# Patient Record
Sex: FEMALE | Race: WHITE | NOT HISPANIC OR LATINO | Employment: FULL TIME | URBAN - METROPOLITAN AREA
[De-identification: names, ages, dates, MRNs, and addresses within clinical notes are randomized per-mention and may not be internally consistent; named-entity substitution may affect disease eponyms.]

---

## 2017-01-05 ENCOUNTER — GENERIC CONVERSION - ENCOUNTER (OUTPATIENT)
Dept: OTHER | Facility: OTHER | Age: 62
End: 2017-01-05

## 2017-01-11 ENCOUNTER — GENERIC CONVERSION - ENCOUNTER (OUTPATIENT)
Dept: OTHER | Facility: OTHER | Age: 62
End: 2017-01-11

## 2017-03-15 ENCOUNTER — ALLSCRIPTS OFFICE VISIT (OUTPATIENT)
Dept: OTHER | Facility: OTHER | Age: 62
End: 2017-03-15

## 2017-03-16 ENCOUNTER — ALLSCRIPTS OFFICE VISIT (OUTPATIENT)
Dept: OTHER | Facility: OTHER | Age: 62
End: 2017-03-16

## 2017-05-01 ENCOUNTER — TRANSCRIBE ORDERS (OUTPATIENT)
Dept: ADMINISTRATIVE | Facility: HOSPITAL | Age: 62
End: 2017-05-01

## 2017-05-01 ENCOUNTER — GENERIC CONVERSION - ENCOUNTER (OUTPATIENT)
Dept: OTHER | Facility: OTHER | Age: 62
End: 2017-05-01

## 2017-05-01 ENCOUNTER — ALLSCRIPTS OFFICE VISIT (OUTPATIENT)
Dept: OTHER | Facility: OTHER | Age: 62
End: 2017-05-01

## 2017-05-01 DIAGNOSIS — Z12.31 VISIT FOR SCREENING MAMMOGRAM: Primary | ICD-10-CM

## 2017-05-04 LAB
ADEQUACY: (HISTORICAL): NORMAL
CLINICIAN PROVIDIED ICD 9 OR 10 (HISTORICAL): NORMAL
COMMENT (HISTORICAL): NORMAL
DIAGNOSIS (HISTORICAL): NORMAL
NOTE: (HISTORICAL): NORMAL
PERFORMED BY (HISTORICAL): NORMAL
TEST INFORMATION (HISTORICAL): NORMAL

## 2017-05-11 ENCOUNTER — GENERIC CONVERSION - ENCOUNTER (OUTPATIENT)
Dept: OTHER | Facility: OTHER | Age: 62
End: 2017-05-11

## 2017-06-01 DIAGNOSIS — Z12.31 ENCOUNTER FOR SCREENING MAMMOGRAM FOR MALIGNANT NEOPLASM OF BREAST: ICD-10-CM

## 2017-06-06 ENCOUNTER — ALLSCRIPTS OFFICE VISIT (OUTPATIENT)
Dept: OTHER | Facility: OTHER | Age: 62
End: 2017-06-06

## 2017-06-07 ENCOUNTER — GENERIC CONVERSION - ENCOUNTER (OUTPATIENT)
Dept: OTHER | Facility: OTHER | Age: 62
End: 2017-06-07

## 2017-06-19 ENCOUNTER — GENERIC CONVERSION - ENCOUNTER (OUTPATIENT)
Dept: OTHER | Facility: OTHER | Age: 62
End: 2017-06-19

## 2017-07-06 ENCOUNTER — TRANSCRIBE ORDERS (OUTPATIENT)
Dept: ADMINISTRATIVE | Facility: HOSPITAL | Age: 62
End: 2017-07-06

## 2017-07-06 DIAGNOSIS — Z09 FOLLOW UP: Primary | ICD-10-CM

## 2017-07-06 DIAGNOSIS — Z12.31 VISIT FOR SCREENING MAMMOGRAM: Primary | ICD-10-CM

## 2017-07-13 ENCOUNTER — ALLSCRIPTS OFFICE VISIT (OUTPATIENT)
Dept: OTHER | Facility: OTHER | Age: 62
End: 2017-07-13

## 2017-07-18 ENCOUNTER — HOSPITAL ENCOUNTER (OUTPATIENT)
Dept: ULTRASOUND IMAGING | Facility: CLINIC | Age: 62
Discharge: HOME | End: 2017-07-18
Payer: COMMERCIAL

## 2017-07-18 ENCOUNTER — HOSPITAL ENCOUNTER (OUTPATIENT)
Dept: MAMMOGRAPHY | Facility: CLINIC | Age: 62
Discharge: HOME | End: 2017-07-18
Payer: COMMERCIAL

## 2017-07-18 DIAGNOSIS — Z12.31 ENCOUNTER FOR SCREENING MAMMOGRAM FOR MALIGNANT NEOPLASM OF BREAST: ICD-10-CM

## 2017-07-18 DIAGNOSIS — Z09 FOLLOW UP: ICD-10-CM

## 2017-07-18 PROCEDURE — 77063 BREAST TOMOSYNTHESIS BI: CPT

## 2017-07-18 PROCEDURE — G0202 SCR MAMMO BI INCL CAD: HCPCS

## 2017-07-18 PROCEDURE — 76642 ULTRASOUND BREAST LIMITED: CPT

## 2017-07-28 ENCOUNTER — TRANSCRIBE ORDERS (OUTPATIENT)
Dept: ADMINISTRATIVE | Age: 62
End: 2017-07-28

## 2017-07-28 ENCOUNTER — APPOINTMENT (OUTPATIENT)
Dept: LAB | Age: 62
End: 2017-07-28
Payer: COMMERCIAL

## 2017-07-28 DIAGNOSIS — M06.9 RHEUMATOID ARTHRITIS, INVOLVING UNSPECIFIED SITE, UNSPECIFIED RHEUMATOID FACTOR PRESENCE: Primary | ICD-10-CM

## 2017-07-28 DIAGNOSIS — M17.0 PRIMARY OSTEOARTHRITIS OF BOTH KNEES: ICD-10-CM

## 2017-07-28 DIAGNOSIS — M47.816 OSTEOARTHRITIS OF LUMBAR SPINE, UNSPECIFIED SPINAL OSTEOARTHRITIS COMPLICATION STATUS: ICD-10-CM

## 2017-07-28 DIAGNOSIS — M15.0 PRIMARY GENERALIZED HYPERTROPHIC OSTEOARTHROSIS: ICD-10-CM

## 2017-07-28 DIAGNOSIS — Q61.3 POLYCYSTIC KIDNEY, UNSPECIFIED TYPE: ICD-10-CM

## 2017-07-28 DIAGNOSIS — I10 ESSENTIAL HYPERTENSION, MALIGNANT: ICD-10-CM

## 2017-07-28 DIAGNOSIS — M06.9 RHEUMATOID ARTHRITIS, INVOLVING UNSPECIFIED SITE, UNSPECIFIED RHEUMATOID FACTOR PRESENCE: ICD-10-CM

## 2017-07-28 LAB
ALBUMIN SERPL BCP-MCNC: 3.9 G/DL (ref 3.5–5)
ALP SERPL-CCNC: 97 U/L (ref 46–116)
ALT SERPL W P-5'-P-CCNC: 27 U/L (ref 12–78)
ANION GAP SERPL CALCULATED.3IONS-SCNC: 6 MMOL/L (ref 4–13)
AST SERPL W P-5'-P-CCNC: 23 U/L (ref 5–45)
BASOPHILS # BLD AUTO: 0.04 THOUSANDS/ΜL (ref 0–0.1)
BASOPHILS NFR BLD AUTO: 1 % (ref 0–1)
BILIRUB SERPL-MCNC: 0.53 MG/DL (ref 0.2–1)
BILIRUB UR QL STRIP: NEGATIVE
BUN SERPL-MCNC: 14 MG/DL (ref 5–25)
CALCIUM SERPL-MCNC: 9.2 MG/DL (ref 8.3–10.1)
CHLORIDE SERPL-SCNC: 101 MMOL/L (ref 100–108)
CHOLEST SERPL-MCNC: 173 MG/DL (ref 50–200)
CLARITY UR: CLEAR
CO2 SERPL-SCNC: 30 MMOL/L (ref 21–32)
COLOR UR: YELLOW
CREAT SERPL-MCNC: 0.86 MG/DL (ref 0.6–1.3)
EOSINOPHIL # BLD AUTO: 0.11 THOUSAND/ΜL (ref 0–0.61)
EOSINOPHIL NFR BLD AUTO: 2 % (ref 0–6)
ERYTHROCYTE [DISTWIDTH] IN BLOOD BY AUTOMATED COUNT: 14.3 % (ref 11.6–15.1)
ERYTHROCYTE [SEDIMENTATION RATE] IN BLOOD: 7 MM/HOUR (ref 0–20)
GFR SERPL CREATININE-BSD FRML MDRD: 72 ML/MIN/1.73SQ M
GLUCOSE P FAST SERPL-MCNC: 107 MG/DL (ref 65–99)
GLUCOSE UR STRIP-MCNC: NEGATIVE MG/DL
HCT VFR BLD AUTO: 39.1 % (ref 34.8–46.1)
HDLC SERPL-MCNC: 51 MG/DL (ref 40–60)
HGB BLD-MCNC: 12.5 G/DL (ref 11.5–15.4)
HGB UR QL STRIP.AUTO: NEGATIVE
KETONES UR STRIP-MCNC: NEGATIVE MG/DL
LDLC SERPL CALC-MCNC: 98 MG/DL (ref 0–100)
LEUKOCYTE ESTERASE UR QL STRIP: NEGATIVE
LYMPHOCYTES # BLD AUTO: 1.46 THOUSANDS/ΜL (ref 0.6–4.47)
LYMPHOCYTES NFR BLD AUTO: 31 % (ref 14–44)
MCH RBC QN AUTO: 30.3 PG (ref 26.8–34.3)
MCHC RBC AUTO-ENTMCNC: 32 G/DL (ref 31.4–37.4)
MCV RBC AUTO: 95 FL (ref 82–98)
MONOCYTES # BLD AUTO: 0.34 THOUSAND/ΜL (ref 0.17–1.22)
MONOCYTES NFR BLD AUTO: 7 % (ref 4–12)
NEUTROPHILS # BLD AUTO: 2.7 THOUSANDS/ΜL (ref 1.85–7.62)
NEUTS SEG NFR BLD AUTO: 59 % (ref 43–75)
NITRITE UR QL STRIP: NEGATIVE
NRBC BLD AUTO-RTO: 0 /100 WBCS
PH UR STRIP.AUTO: 6.5 [PH] (ref 4.5–8)
PLATELET # BLD AUTO: 285 THOUSANDS/UL (ref 149–390)
PMV BLD AUTO: 11.6 FL (ref 8.9–12.7)
POTASSIUM SERPL-SCNC: 3.8 MMOL/L (ref 3.5–5.3)
PROT SERPL-MCNC: 7.3 G/DL (ref 6.4–8.2)
PROT UR STRIP-MCNC: NEGATIVE MG/DL
RBC # BLD AUTO: 4.12 MILLION/UL (ref 3.81–5.12)
SODIUM SERPL-SCNC: 137 MMOL/L (ref 136–145)
SP GR UR STRIP.AUTO: 1.02 (ref 1–1.03)
TRIGL SERPL-MCNC: 120 MG/DL
TSH SERPL DL<=0.05 MIU/L-ACNC: 2.87 UIU/ML (ref 0.36–3.74)
UROBILINOGEN UR QL STRIP.AUTO: 0.2 E.U./DL
WBC # BLD AUTO: 4.66 THOUSAND/UL (ref 4.31–10.16)

## 2017-07-28 PROCEDURE — 80053 COMPREHEN METABOLIC PANEL: CPT

## 2017-07-28 PROCEDURE — 85652 RBC SED RATE AUTOMATED: CPT

## 2017-07-28 PROCEDURE — 36415 COLL VENOUS BLD VENIPUNCTURE: CPT

## 2017-07-28 PROCEDURE — 85025 COMPLETE CBC W/AUTO DIFF WBC: CPT

## 2017-07-28 PROCEDURE — 80061 LIPID PANEL: CPT

## 2017-07-28 PROCEDURE — 84443 ASSAY THYROID STIM HORMONE: CPT

## 2017-07-28 PROCEDURE — 81003 URINALYSIS AUTO W/O SCOPE: CPT | Performed by: INTERNAL MEDICINE

## 2017-07-30 ENCOUNTER — GENERIC CONVERSION - ENCOUNTER (OUTPATIENT)
Dept: OTHER | Facility: OTHER | Age: 62
End: 2017-07-30

## 2017-08-25 DIAGNOSIS — H04.123 DRY EYE SYNDROME OF BOTH LACRIMAL GLANDS: ICD-10-CM

## 2017-08-25 DIAGNOSIS — I10 ESSENTIAL (PRIMARY) HYPERTENSION: ICD-10-CM

## 2017-08-25 DIAGNOSIS — Q61.3 POLYCYSTIC KIDNEY: ICD-10-CM

## 2017-08-29 ENCOUNTER — ALLSCRIPTS OFFICE VISIT (OUTPATIENT)
Dept: OTHER | Facility: OTHER | Age: 62
End: 2017-08-29

## 2017-08-29 LAB
BILIRUB UR QL STRIP: NEGATIVE
CLARITY UR: NORMAL
COLOR UR: YELLOW
GLUCOSE (HISTORICAL): NEGATIVE
HGB UR QL STRIP.AUTO: NEGATIVE
KETONES UR STRIP-MCNC: NEGATIVE MG/DL
LEUKOCYTE ESTERASE UR QL STRIP: NEGATIVE
NITRITE UR QL STRIP: NEGATIVE
PH UR STRIP.AUTO: 6.5 [PH]
PROT UR STRIP-MCNC: NEGATIVE MG/DL
SP GR UR STRIP.AUTO: 1.01
UROBILINOGEN UR QL STRIP.AUTO: 0.2

## 2017-09-07 ENCOUNTER — HOSPITAL ENCOUNTER (OUTPATIENT)
Dept: RADIOLOGY | Age: 62
Discharge: HOME | End: 2017-09-07
Payer: COMMERCIAL

## 2017-09-07 DIAGNOSIS — Q61.3 POLYCYSTIC KIDNEY: ICD-10-CM

## 2017-09-07 PROCEDURE — 76770 US EXAM ABDO BACK WALL COMP: CPT

## 2017-09-14 ENCOUNTER — APPOINTMENT (OUTPATIENT)
Dept: LAB | Age: 62
End: 2017-09-14
Payer: COMMERCIAL

## 2017-09-14 ENCOUNTER — TRANSCRIBE ORDERS (OUTPATIENT)
Dept: ADMINISTRATIVE | Age: 62
End: 2017-09-14

## 2017-09-14 DIAGNOSIS — H04.123 DRY EYE SYNDROME OF BOTH LACRIMAL GLANDS: ICD-10-CM

## 2017-09-14 DIAGNOSIS — Q61.3 POLYCYSTIC KIDNEY: ICD-10-CM

## 2017-09-14 DIAGNOSIS — I10 ESSENTIAL (PRIMARY) HYPERTENSION: ICD-10-CM

## 2017-09-14 LAB
CREAT UR-MCNC: 95.2 MG/DL
PROT UR-MCNC: 17 MG/DL
PROT/CREAT UR: 0.18 MG/G{CREAT} (ref 0–0.1)

## 2017-09-14 PROCEDURE — 82570 ASSAY OF URINE CREATININE: CPT

## 2017-09-14 PROCEDURE — 86235 NUCLEAR ANTIGEN ANTIBODY: CPT

## 2017-09-14 PROCEDURE — 36415 COLL VENOUS BLD VENIPUNCTURE: CPT

## 2017-09-14 PROCEDURE — 84156 ASSAY OF PROTEIN URINE: CPT

## 2017-09-15 LAB
ENA SS-A AB SER-ACNC: <0.2 AI (ref 0–0.9)
ENA SS-B AB SER-ACNC: <0.2 AI (ref 0–0.9)

## 2017-09-27 ENCOUNTER — GENERIC CONVERSION - ENCOUNTER (OUTPATIENT)
Dept: OTHER | Facility: OTHER | Age: 62
End: 2017-09-27

## 2017-10-24 ENCOUNTER — TRANSCRIBE ORDERS (OUTPATIENT)
Dept: ADMINISTRATIVE | Age: 62
End: 2017-10-24

## 2017-10-24 ENCOUNTER — LAB (OUTPATIENT)
Dept: LAB | Age: 62
End: 2017-10-24
Payer: COMMERCIAL

## 2017-10-24 DIAGNOSIS — M06.09 RHEUMATOID ARTHRITIS OF MULTIPLE SITES WITHOUT RHEUMATOID FACTOR (HCC): ICD-10-CM

## 2017-10-24 DIAGNOSIS — M06.09 RHEUMATOID ARTHRITIS OF MULTIPLE SITES WITHOUT RHEUMATOID FACTOR (HCC): Primary | ICD-10-CM

## 2017-10-24 DIAGNOSIS — Z79.899 NEED FOR PROPHYLACTIC CHEMOTHERAPY: ICD-10-CM

## 2017-10-24 LAB
BASOPHILS # BLD AUTO: 0.03 THOUSANDS/ΜL (ref 0–0.1)
BASOPHILS NFR BLD AUTO: 1 % (ref 0–1)
BILIRUB UR QL STRIP: NEGATIVE
CLARITY UR: CLEAR
COLOR UR: YELLOW
EOSINOPHIL # BLD AUTO: 0.13 THOUSAND/ΜL (ref 0–0.61)
EOSINOPHIL NFR BLD AUTO: 2 % (ref 0–6)
ERYTHROCYTE [DISTWIDTH] IN BLOOD BY AUTOMATED COUNT: 13.8 % (ref 11.6–15.1)
ERYTHROCYTE [SEDIMENTATION RATE] IN BLOOD: 7 MM/HOUR (ref 0–20)
GLUCOSE UR STRIP-MCNC: NEGATIVE MG/DL
HCT VFR BLD AUTO: 35.5 % (ref 34.8–46.1)
HGB BLD-MCNC: 11.8 G/DL (ref 11.5–15.4)
HGB UR QL STRIP.AUTO: NEGATIVE
KETONES UR STRIP-MCNC: NEGATIVE MG/DL
LEUKOCYTE ESTERASE UR QL STRIP: NEGATIVE
LYMPHOCYTES # BLD AUTO: 1.99 THOUSANDS/ΜL (ref 0.6–4.47)
LYMPHOCYTES NFR BLD AUTO: 34 % (ref 14–44)
MCH RBC QN AUTO: 31.6 PG (ref 26.8–34.3)
MCHC RBC AUTO-ENTMCNC: 33.2 G/DL (ref 31.4–37.4)
MCV RBC AUTO: 95 FL (ref 82–98)
MONOCYTES # BLD AUTO: 0.41 THOUSAND/ΜL (ref 0.17–1.22)
MONOCYTES NFR BLD AUTO: 7 % (ref 4–12)
NEUTROPHILS # BLD AUTO: 3.36 THOUSANDS/ΜL (ref 1.85–7.62)
NEUTS SEG NFR BLD AUTO: 56 % (ref 43–75)
NITRITE UR QL STRIP: NEGATIVE
NRBC BLD AUTO-RTO: 0 /100 WBCS
PH UR STRIP.AUTO: 7.5 [PH] (ref 4.5–8)
PLATELET # BLD AUTO: 248 THOUSANDS/UL (ref 149–390)
PMV BLD AUTO: 11.5 FL (ref 8.9–12.7)
PROT UR STRIP-MCNC: NEGATIVE MG/DL
RBC # BLD AUTO: 3.74 MILLION/UL (ref 3.81–5.12)
SP GR UR STRIP.AUTO: 1.01 (ref 1–1.03)
UROBILINOGEN UR QL STRIP.AUTO: 0.2 E.U./DL
WBC # BLD AUTO: 5.93 THOUSAND/UL (ref 4.31–10.16)

## 2017-10-24 PROCEDURE — 85025 COMPLETE CBC W/AUTO DIFF WBC: CPT

## 2017-10-24 PROCEDURE — 85652 RBC SED RATE AUTOMATED: CPT

## 2017-10-24 PROCEDURE — 80053 COMPREHEN METABOLIC PANEL: CPT

## 2017-10-24 PROCEDURE — 81003 URINALYSIS AUTO W/O SCOPE: CPT | Performed by: INTERNAL MEDICINE

## 2017-10-24 PROCEDURE — 36415 COLL VENOUS BLD VENIPUNCTURE: CPT

## 2017-10-25 LAB
ALBUMIN SERPL BCP-MCNC: 3.9 G/DL (ref 3.5–5)
ALP SERPL-CCNC: 89 U/L (ref 46–116)
ALT SERPL W P-5'-P-CCNC: 35 U/L (ref 12–78)
ANION GAP SERPL CALCULATED.3IONS-SCNC: 9 MMOL/L (ref 4–13)
AST SERPL W P-5'-P-CCNC: 30 U/L (ref 5–45)
BILIRUB SERPL-MCNC: 0.5 MG/DL (ref 0.2–1)
BUN SERPL-MCNC: 19 MG/DL (ref 5–25)
CALCIUM SERPL-MCNC: 8.7 MG/DL (ref 8.3–10.1)
CHLORIDE SERPL-SCNC: 98 MMOL/L (ref 100–108)
CO2 SERPL-SCNC: 29 MMOL/L (ref 21–32)
CREAT SERPL-MCNC: 0.93 MG/DL (ref 0.6–1.3)
GFR SERPL CREATININE-BSD FRML MDRD: 66 ML/MIN/1.73SQ M
GLUCOSE SERPL-MCNC: 91 MG/DL (ref 65–140)
POTASSIUM SERPL-SCNC: 4.4 MMOL/L (ref 3.5–5.3)
PROT SERPL-MCNC: 7.2 G/DL (ref 6.4–8.2)
SODIUM SERPL-SCNC: 136 MMOL/L (ref 136–145)

## 2018-01-10 NOTE — RESULT NOTES
Verified Results  *US BREAST RIGHT LIMITED (DIAGNOSTIC) [id number] 04:27PM Sandee Edwards Order Number: [id number]    - Patient Instructions: To schedule this appointment, please contact Central Scheduling at [phone]  Test Name Result Flag Reference   US BREAST RIGHT LIMITED (Report)     Patient History:   Patient is postmenopausal    Family history of unknown cancer in father  Reason for exam: clinical finding  Mammo Diagnostic Left W CAD: December 13, 2016 - Check In #:    [de-identified]   CC, MLO, ML, spot compression MLO, and spot compression CC    view(s) were taken of the left breast      Technologist: SKIP Briceno (SKIP)(M)   Prior study comparison: June 6, 2016, mammo screening bilateral W   CAD performed at [address] [place] [address]  June 6, 2016, US breast right limited performed at [address] [place] [address]  December 3, 2015, left breast unilateral complete   WBUS UPUS performed at [address] [place] [address]  December 3, 2015, right breast unilateral complete WBUS UPUS    performed at [address] [place] [address]  December 3,    2015, right breast unilateral limited RBC UP performed at [address] [place] [address]  May 29, 2015, right breast    ultrasound, performed at [facility]  May 21, 2015, digital    bilateral screening mammogram, performed at [facility]     April 8, 2014, digital bilateral screening mammogram, performed    at [facility]  March 5, 2013, digital bilateral screening    mammogram, performed at [facility]      The breast tissue is heterogeneously dense, potentially limiting    the sensitivity of mammography  Patient risk, included in this    report, assists in determining the appropriate screening regimen    (such as 3-D mammography or the inclusion of automated breast    ultrasound or MRI)  3-D mammography may also remain indicated as    screening       US Breast Right Limited: December 13, 2016 - Check In #: [id number]   Technologist: Marko Kiran RDMS   Prior to imaging, a radiopaque marker was applied to the skin of    the left breast in the area of pain, as indicated by the patient  The parenchymal pattern is stable  There are no dominant masses    or suspicious microcalcifications  There is also no evidence of    architectural distortion or skin thickening  The left axillary    region is benign  Following diagnostic mammography, the patient was taken to the    ultrasound suite  BILATERAL BREAST ULTRASOUND:     Grayscale sonography of both breasts was performed in multiple    projections using real time imaging  RIGHT:   6 nodules are seen in the right breast      At the 12 o'clock position, in a periareolar location, there is a   well-defined hypoechoic nodule with posterior enhancement,    measuring 0 6 x 0 4 x 0 4 cm  At the 11 o'clock position, 1 cm from the nipple, there is a    hypoechoic 1 x 1 1 x 0 7 cm nodule  At the 12 o'clock position, 1 cm from the nipple, there is a 0 7    x 0 5 x 0 2 cm markedly hypoechoic nodule  At the 10 o'clock position, 3 to 4 cm from the nipple, there is a   hypoechoic nodule with posterior enhancement, measuring 0 6 x    0 4 x 0 4 cm  At the 2 o'clock position, 6 cm from the nipple, there is a    hypoechoic nodule which measures 0 5 x 0 5 x 0 2 cm  At the 2 o'clock position, 4 cm from the nipple, there is a    hypoechoic 0 6 x 0 5 x 0 1 cm nodule  None of these nodules demonstrate internal vascularity  There    are also not significantly changed when compared to studies    dating back to May 2015  LEFT BREAST:   Grayscale sonography of the inferior left breast in the area of    pain reveals no focal cystic or solid nodules  1  Stable left breast mammogram    2  Unremarkable left breast ultrasound in the area of pain  3  Stable nodules in the right breast as seen on ultrasound     4  The patient should return in 6 months for bilateral screening   mammography and repeat right breast ultrasound  This would    fulfill the criteria for yearly mammography and complete two-year   follow-up of the right breast probably benign nodules  5  Management of any clinical symptoms must be based on clinical   grounds, despite negative imaging results  US Breast Left Limited: December 13, 2016 - Check In #: [de-identified]   Technologist: Benjamin Blankenship RDMS     ASSESSMENT: BiRad:3 - Probably Benign (Overall)     Recommendation:   Clinical management  Ultrasound of the right breast in 6 months  Routine screening mammogram of both breasts in 6 months     Analyzed by CAD     Transcription Location: [address]   Signing Station: [id number]     Risk Value(s):   Tyrer-Cuzick 10 Year: 4 222%, Tyrer-Cuzick Lifetime: 9 727%,    Myriad Table: 1 5%, TRACEY 5 Year: 1 5%, NCI Lifetime: 7 0%   Signed by:   Terri Martinez MD   12/13/16       Discussion/Summary   F/U 6 MONTHS FOR REPEAT US AND ROUTINE MAMMOGRAPHY   YOU SHOULD BE AWARE OF THIS FROM CENTER

## 2018-01-11 NOTE — PROGRESS NOTES
Assessment   1  Well adult on routine health check (V70 0) (Z00 00)  2  Rheumatoid arthritis (714 0) (M06 9)  3  Need for prophylactic vaccination against Streptococcus pneumoniae (pneumococcus)   (V03 82) (Z23)    Plan  Need for prophylactic vaccination against Streptococcus pneumoniae (pneumococcus)    · Administered: Prevnar 13 Intramuscular Suspension  Well adult on routine health check    · Follow-up visit in 1 year Evaluation and Treatment  Follow-up  Status: Hold For -  Scheduling  Requested for: 10LOW9342   · Eat a low fat and low cholesterol diet ; Status:Complete;   Done: 17WVC0753   · There are many exercise options for seniors ; Status:Complete;   Done: 84PEH9111   · These are things you can do to prevent falls in and around the home ; Status:Complete;    Done: 12FQU3028   · Use a sun block product with an SPF of 15 or more ; Status:Complete;   Done:  77KRC5712   · We encourage all of our patients to exercise regularly  30 minutes of exercise or physical  activity five or more days a week is recommended for children and adults ;  Status:Complete;   Done: 07HVM5084   · We recommend you modify your diet to achieve and maintain a healthy weight  Being  underweight may increase your risk of developing health problems from vitamin and  mineral deficiencies  We recommend a balanced diet rich in fruits and vegetables  You  may also consider increasing your calorie intake by eating more frequently or adding  nuts, avocados, and low-fat cheese or milk to your meals    Please let us know  if you would like to learn more about your nutrition and calorie needs, and additional  options to help you achieve your weight goals ; Status:Complete;   Done: 69VHU0316   · Call [phone] if: You find a new or different kind of lump in your breast ;  Status:Complete;   Done: 16ONY8234   · Call [phone] if: You have any bleeding from the vagina ; Status:Complete;    Done: 45BCI6659   · Call [phone] if: You have any warning signs of skin cancer ; Status:Complete;    Done: 88ARQ1435    seeing dr Prachi Bustillo for RA     Discussion/Summary  health maintenance visit Currently, she eats a healthy diet  cervical cancer screening is current Breast cancer screening: mammogram is current  Colorectal cancer screening: colorectal cancer screening is current  The immunizations are up to date  Chief Complaint  Pt here for annual wellness visit      History of Present Illness  HM, Adult Female: The patient is being seen for a health maintenance evaluation  General Health: The patient's health since the last visit is described as good  She has regular dental visits  She denies vision problems  She denies hearing loss  Immunizations status: up to date  Lifestyle:  She has weight concerns  She does not exercise regularly  She does not use tobacco  She denies alcohol use  She denies drug use  Reproductive health: the patient is postmenopausal    Screening: cancer screening reviewed and updated  Cervical cancer screening includes a pap smear performed last year  Breast cancer screening includes a mammogram performed last year  Colorectal cancer screening includes a colonoscopy performed within the past ten years  metabolic screening reviewed and updated  Metabolic screening includes lipid profile performed within the past five years, glucose screening performed last year and thyroid function test performed last year  Review of Systems    Constitutional: No fever, no chills, feels well, no tiredness, no recent weight gain or weight loss  Eyes: No complaints of eye pain, no red eyes, no eyesight problems, no discharge, no dry eyes, no itching of eyes  ENT: no complaints of earache, no loss of hearing, no nose bleeds, no nasal discharge, no sore throat, no hoarseness  Cardiovascular: No complaints of slow heart rate, no fast heart rate, no chest pain, no palpitations, no leg claudication, no lower extremity edema  Respiratory: No complaints of shortness of breath, no wheezing, no cough, no SOB on exertion, no orthopnea, no PND  Gastrointestinal: No complaints of abdominal pain, no constipation, no nausea or vomiting, no diarrhea, no bloody stools  Genitourinary: No complaints of dysuria, no incontinence, no pelvic pain, no dysmenorrhea, no vaginal discharge or bleeding  Musculoskeletal: back pain, but as noted in HPI  Integumentary: No complaints of skin rash or lesions, no itching, no skin wounds, no breast pain or lump  Neurological: No complaints of headache, no confusion, no convulsions, no numbness, no dizziness or fainting, no tingling, no limb weakness, no difficulty walking  Psychiatric: Not suicidal, no sleep disturbance, no anxiety or depression, no change in personality, no emotional problems  Endocrine: No complaints of proptosis, no hot flashes, no muscle weakness, no deepening of the voice, no feelings of weakness  Hematologic/Lymphatic: No complaints of swollen glands, no swollen glands in the neck, does not bleed easily, does not bruise easily  Active Problems   1  Abdominal bloating (787 3) (R14 0)  2  Abnormal finding on mammography (793 80) (R92 8)  3  Anxiety (300 00) (F41 9)  4  Bowel habit changes (787 99) (R19 4)  5  Dense breast tissue (793 82) (R92 2)  6  Encounter for routine gynecological examination (V72 31) (Z01 419)  7  Encounter for screening for malignant neoplasm of colon (V76 51) (Z12 11)  8  Encounter for screening mammogram for malignant neoplasm of breast (V76 12)   (Z12 31)  9  Fibromyalgia (729 1) (M79 7)  10  Hip pain, unspecified laterality  11  Hypertension (401 9) (I10)  12  Intrinsic eczema (691 8) (L20 84)  13  Irritable bowel syndrome (564 1) (K58 9)  14  Need for prophylactic vaccination and inoculation against influenza (V04 81) (Z23)  15  Need for prophylactic vaccination with tetanus-diphtheria (TD) (V06 5) (Z23)  16  Palpitations (785 1) (R00 2)  17  Pap smear with atypical squamous cells of undetermined sign (ASC-US) (796 9) (R89 6)  18  Pap smear, as part of routine gynecological examination (V76 2) (Z01 419)  19  Polyp of sigmoid colon (211 3) (D12 5)  20  Rheumatoid arthritis (714 0) (M06 9)  21  Routine Gynecological Exam With Cervical Pap Smear (V72 31)  22  Thickened endometrium (793 5) (R93 8)  23   Visit for screening mammogram (6 12) (Z12 31)    Past Medical History    · History of Acute frontal sinusitis, recurrence not specified (461 1) (J01 10)   · History of Acute maxillary sinusitis (461 0) (J01 00)   · History of Acute maxillary sinusitis, recurrence not specified (461 0) (J01 00)   · Acute nonsuppurative otitis media, unspecified laterality (381 00) (H65 199)   · 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 70)  (T50 0)   · History of Encounter for routine gynecological examination (V72 31) (Z01 419)   · History of Encounter for screening mammogram for malignant neoplasm of breast  (V76 12) (Z12 31)   · History of abnormal cervical Papanicolaou smear (V13 29) (H37 889)   · History of acute sinusitis (V12 69) (Z87 09)   · History of anemia (V12 3) (Z86 2)   · History of dysfunctional uterine bleeding (V13 29) (Z87 42)   · History of headache (V13 89) (S54 522)   · History of vertigo (V12 49) (P55 929)   · History of Pap smear, as part of routine gynecological examination (V76 2) (Z01 419)   · History of Plantar fasciitis (728 71) (M72 2)   · History of Rheumatoid arthritis (714 0) (M06 9)   · History of Routine Gynecological Exam With Cervical Pap Smear (V72 31)   · History of Ultrasound Gynecologic   · History of Ultrasound Trans-Vaginal Endometrium Thickness (___ Mm)   · History of Visit for screening mammogram (V76 12) (Z12 31)    Surgical History    · History of  Section   · History of Dilation And Curettage   · History of Endometrial Biopsy By Suction   · History of Hysteroscopy    Family History  Mother    · Family history of Benign Essential Hypertension   · Family history of Hypothyroidism  Father    · Family history of Acute Myocardial Infarction (V17 3)   · Family history of Benign Essential Hypertension   · Family history of Esophageal Cancer (V16 0)   · Family history of Father  At Age ___   · Family history of Glaucoma    Social History    · Alcohol Use (History)   · Socially   · Denied: Former Smoker (V15 82)   · Never A Smoker   · Remote social alcohol use    Current Meds  1  Amitriptyline HCl - 10 MG Oral Tablet; Therapy: 77ZWS2749 to (Last Rx:2010)  Requested for: 15HFJ7854 Ordered  2  Colace CAPS; Therapy: (Recorded:2014) to Recorded  3  Cymbalta 60 MG Oral Capsule Delayed Release Particles; Therapy: 83NOT7633 to (Last Elwyn Tanja)  Requested for: 83KXD2422 Ordered  4  Fish Oil CAPS; Therapy: (DBTKCDYM:24ZYQ7979) to Recorded  5  Hydrocortisone-Acetic Acid 1-2 % Otic Solution; INSTILL 3 DROPS IN THE LEFT EAR AT   BEDTIME; Therapy: 69JBR4529 to (Last Rx:2015)  Requested for: 74IPN5098 Ordered  6  Lisinopril-Hydrochlorothiazide 20-25 MG Oral Tablet; take one tablet by mouth every   day; Therapy: 79Ctc0560 to (Evaluate:2017)  Requested for: 2016; Last   Rx:2016 Ordered  7  Lyrica 150 MG Oral Capsule; TAKE 1 CAPSULE TWICE DAILY; Therapy: (Recorded:2015) to Recorded  8  Lyrica 75 MG Oral Capsule; TAKE 1 CAPSULE TWICE DAILY; Therapy: 65Pus1165 to (Evaluate:06Egu8647); Last Rx:80Nib6577 Ordered  9  Meloxicam 7 5 MG Oral Tablet; TAKE 1 TABLET TWICE DAILY AS NEEDED; Therapy: 49Inf2994 to Recorded  10  Multi-Vitamin Oral Tablet; Therapy: (53-69-10-18) to Recorded  11  Nasonex 50 MCG/ACT Nasal Suspension; USE 2 SPRAYS IN EACH NOSTRIL ONCE    DAILY; Therapy: 90QKW7079 to (Last Rx:2015)  Requested for: 2015 Ordered  12  SulfaSALAzine 500 MG Oral Tablet Delayed Release; take 2 tablet twice daily;     Therapy: 29NHU5216 to (Evaluate:03Jan2016)  Requested for: 56LEN6287 Recorded    Allergies   1  No Known Drug Allergies   2  No Known Environmental Allergies  3  No Known Food Allergies    Vitals   Recorded: 44IRD5732 03:56PM   Temperature 97 F   Heart Rate 86   Respiration 16   Systolic 720   Diastolic 62   Height 5 ft 5 59 in   Weight 204 lb    BMI Calculated 33 34   BSA Calculated 2 01     Physical Exam    Constitutional   General appearance: No acute distress, well appearing and well nourished  Head and Face   Head and face: Normal     Eyes   Conjunctiva and lids: No swelling, erythema or discharge  Pupils and irises: Equal, round, reactive to light  Ears, Nose, Mouth, and Throat   External inspection of ears and nose: Normal     Otoscopic examination: Tympanic membranes translucent with normal light reflex  Canals patent without erythema  Hearing: Normal     Nasal mucosa, septum, and turbinates: Normal without edema or erythema  Lips, teeth, and gums: Normal, good dentition  Oropharynx: Normal with no erythema, edema, exudate or lesions  Neck   Neck: Supple, symmetric, trachea midline, no masses  Thyroid: Normal, no thyromegaly  Pulmonary   Respiratory effort: No increased work of breathing or signs of respiratory distress  Auscultation of lungs: Clear to auscultation  Cardiovascular   Auscultation of heart: Normal rate and rhythm, normal S1 and S2, no murmurs  Examination of extremities for edema and/or varicosities: Normal     Abdomen   Abdomen: Non-tender, no masses  Liver and spleen: No hepatomegaly or splenomegaly  Lymphatic   Palpation of lymph nodes in neck: No lymphadenopathy  Palpation of lymph nodes in axillae: No lymphadenopathy  Musculoskeletal   Gait and station: Normal     Digits and nails: Normal without clubbing or cyanosis      Joints, bones, and muscles: Normal     Range of motion: Normal     Stability: Normal     Muscle strength/tone: Normal     Skin   Skin and subcutaneous tissue: Normal without rashes or lesions  Palpation of skin and subcutaneous tissue: Normal turgor  Neurologic   Cranial nerves: Cranial nerves II-XII intact  Cortical function: Normal mental status  Reflexes: 2+ and symmetric  Sensation: No sensory loss  Coordination: Normal finger to nose and heel to shin  Psychiatric   Judgment and insight: Normal     Orientation to person, place, and time: Normal     Recent and remote memory: Intact      Mood and affect: Normal        Future Appointments    Date/Time Provider Specialty Site   06/07/2017 04:00 PM Nitin Castorena DO Family Medicine [address] [facility]     Signatures   Electronically signed by : Alexia Presley DO; Dec  6 2016  5:54PM EST                       (Author)

## 2018-01-12 VITALS
HEIGHT: 66 IN | SYSTOLIC BLOOD PRESSURE: 116 MMHG | DIASTOLIC BLOOD PRESSURE: 60 MMHG | WEIGHT: 203.5 LBS | BODY MASS INDEX: 32.71 KG/M2 | HEART RATE: 88 BPM | RESPIRATION RATE: 18 BRPM

## 2018-01-12 NOTE — RESULT NOTES
Verified Results  (1) LIPID PANEL, FASTING [id number] 07:34AM Sixto Mac     Test Name Result Flag Reference   CHOLESTEROL 173 mg/dL     HDL,DIRECT 46 mg/dL  40-60   Specimen collection should occur prior to Metamizole administration due to the potential for falsely depressed results  LDL CHOLESTEROL CALCULATED 95 mg/dL  0-100   Triglyceride:         Normal              <150 mg/dl       Borderline High    150-199 mg/dl       High               200-499 mg/dl       Very High          >499 mg/dl  Cholesterol:         Desirable        <200 mg/dl      Borderline High  200-239 mg/dl      High             >239 mg/dl  HDL Cholesterol:        High    >59 mg/dL      Low     <41 mg/dL  LDL CALCULATED:    This screening LDL is a calculated result  It does not have the accuracy of the Direct Measured LDL in the monitoring of patients with hyperlipidemia and/or statin therapy  Direct Measure LDL (NQW069) must be ordered separately in these patients  TRIGLYCERIDES 160 mg/dL H <=150   Specimen collection should occur prior to N-Acetylcysteine or Metamizole administration due to the potential for falsely depressed results  (1) TSH WITH FT4 REFLEX [id number] 07:34AM Sixto Mac     Test Name Result Flag Reference   TSH 3 300 uIU/mL  0 358-3 740   Patients undergoing fluorescein dye angiography may retain small amounts of fluorescein in the body for 48-72 hours post procedure  Samples containing fluorescein can produce falsely depressed TSH values  If the patient had this procedure,a specimen should be resubmitted post fluorescein clearance  The recommended reference ranges for TSH during pregnancy are as follows:  First trimester 0 1 to 2 5 uIU/mL  Second trimester  0 2 to 3 0 uIU/mL  Third trimester 0 3 to 3 0 uIU/m       Discussion/Summary   TRIGLYCERIDES ARE HIGH   WATCH STARCHY FOODS      [facility]

## 2018-01-13 VITALS
DIASTOLIC BLOOD PRESSURE: 68 MMHG | SYSTOLIC BLOOD PRESSURE: 112 MMHG | HEIGHT: 66 IN | WEIGHT: 207.5 LBS | BODY MASS INDEX: 33.35 KG/M2

## 2018-01-13 VITALS
BODY MASS INDEX: 32.32 KG/M2 | RESPIRATION RATE: 18 BRPM | SYSTOLIC BLOOD PRESSURE: 100 MMHG | HEIGHT: 66 IN | DIASTOLIC BLOOD PRESSURE: 70 MMHG | HEART RATE: 82 BPM | WEIGHT: 201.13 LBS

## 2018-01-13 NOTE — MISCELLANEOUS
Provider Comments  Provider Comments:   Our records indicate that you missed an appointment on 06/07/17  If you have not done so already, please call our office and we will be happy to schedule another appointment for you  If you must cancel your appointment, our office policy requires you to call our office at least 24 hours in advance so that we may utilize your appointment time for another patient who requires our services  If you have any questions, please contact our office at [phone]           Signatures   Electronically signed by : Antonio Myles, ; Jun 7 2017 10:53AM EST                       (Author)

## 2018-01-14 VITALS
WEIGHT: 202.13 LBS | TEMPERATURE: 98.1 F | DIASTOLIC BLOOD PRESSURE: 58 MMHG | SYSTOLIC BLOOD PRESSURE: 98 MMHG | HEIGHT: 66 IN | HEART RATE: 84 BPM | RESPIRATION RATE: 16 BRPM | BODY MASS INDEX: 32.48 KG/M2

## 2018-01-14 VITALS
SYSTOLIC BLOOD PRESSURE: 118 MMHG | WEIGHT: 197.38 LBS | BODY MASS INDEX: 31.72 KG/M2 | HEIGHT: 66 IN | DIASTOLIC BLOOD PRESSURE: 64 MMHG

## 2018-01-14 VITALS
HEART RATE: 70 BPM | BODY MASS INDEX: 32.53 KG/M2 | DIASTOLIC BLOOD PRESSURE: 62 MMHG | HEIGHT: 66 IN | SYSTOLIC BLOOD PRESSURE: 100 MMHG | WEIGHT: 202.38 LBS

## 2018-01-16 NOTE — PROGRESS NOTES
History of Present Illness  [employer] Revaccination:   Revaccination   Vaccine Information: Vaccine(s) Given (names): Cele Porras [id number]  Spoke with patient regarding vaccine out of temperature range  Action(s): Pt will be revaccinated  Pt contacted and will call back  Pt called (attempt 1): [id number] KRL   Revaccination Completed: [id number]  Active Problems     1  Anxiety (300 00) (F41 9)   2  Chronic constipation (564 00) (K59 09)   3  Dense breast tissue (793 82) (R92 2)   4  Encounter for routine gynecological examination (V72 31) (Z01 419)   5  Encounter for screening for malignant neoplasm of colon (V76 51) (Z12 11)   6  Encounter for screening mammogram for malignant neoplasm of breast (V76 12)   (Z12 31)   7  Fibromyalgia (729 1) (M79 7)   8  Hip pain, unspecified laterality   9  Intrinsic eczema (691 8) (L20 84)   10  Irritable bowel syndrome (564 1) (K58 9)   11  Need for prophylactic vaccination against Streptococcus pneumoniae (pneumococcus)    (V03 82) (Z23)   12  Need for prophylactic vaccination and inoculation against influenza (V04 81) (Z23)   13  Need for prophylactic vaccination with tetanus-diphtheria (TD) (V06 5) (Z23)   14  Need for revaccination (V05 9) (Z23)   15  Palpitations (785 1) (R00 2)   16  Pap smear with atypical squamous cells of undetermined sign (ASC-US) (796 9) (R89 6)   17  Pap smear, as part of routine gynecological examination (V76 2) (Z01 419)   18  Polyp of sigmoid colon (211 3) (D12 5)   19  Routine Gynecological Exam With Cervical Pap Smear (V72 31)   20  Spinal stenosis of lumbar region (724 02) (M48 06)   21  Thickened endometrium (793 5) (R93 8)   22  Visit for screening mammogram (V76 12) (Z12 31)   23  Well adult on routine health check (V70 0) (Z00 00)    Hypertension (401 9) (I10)       Rheumatoid arthritis (714 0) (M06 9)          Current Meds   1  Amitriptyline HCl - 10 MG Oral Tablet; TAKE 1 TABLET AT BEDTIME;    Therapy: 30XFB7867 to (Evaluate:94Zpk2172) Requested for: 02RMD5063 Recorded   2  Colace CAPS; Therapy: (Recorded:15Mar2017) to Recorded   3  Cymbalta 60 MG Oral Capsule Delayed Release Particles; take 1 capsule daily; Therapy: 09HUW7611 to (Evaluate:10Mar2018)  Requested for: 13JKU3836 Recorded   4  Fish Oil CAPS; Therapy: (Recorded:15Mar2017) to Recorded   5  Lisinopril-Hydrochlorothiazide 20-25 MG Oral Tablet; take 1 tablet by mouth every day; Therapy: 18Apr2012 to (Evaluate:29Mar2018)  Requested for: 03Apr2017; Last   Rx:03Apr2017 Ordered   6  Lyrica 150 MG Oral Capsule; TAKE 1 CAPSULE TWICE DAILY; Therapy: (Recorded:15Mar2017) to Recorded   7  Lyrica 75 MG Oral Capsule; TAKE 1 CAPSULE TWICE DAILY; Therapy: 03Van3991 to (Evaluate:25Feb2020); Last Rx:29Aug2012 Ordered   8  Meloxicam 7 5 MG Oral Tablet; TAKE 1 TABLET TWICE DAILY AS NEEDED; Therapy: 27Apr2016 to Recorded   9  Multi-Vitamin Oral Tablet; Therapy: (Recorded:15Mar2017) to Recorded   10  SulfaSALAzine 500 MG Oral Tablet Delayed Release; take 2 tablet twice daily; Therapy: 96PYC3148 to (Evaluate:03Jan2016)  Requested for: 85GDD7048 Recorded    Allergies    1  No Known Drug Allergies    2  No Known Environmental Allergies   3   No Known Food Allergies    Plan  Need for revaccination    · RVAC-Adacel 5-2-15 5 LF-MCG/0 5 Intramuscular Suspension    Signatures   Electronically signed by : Francine Ward DO; Jul 13 2017  5:07PM EST                       (Author)

## 2018-01-18 NOTE — RESULT NOTES
Discussion/Summary   BLOOD SUGAR IS UP A BIT  OTHERWISE GOOD   DR Claudean Platts     Verified Results  (1) COMPREHENSIVE METABOLIC PANEL [id number] 58:31DX Yuniel Hard     Test Name Result Flag Reference   SODIUM 137 mmol/L  136-145   POTASSIUM 3 8 mmol/L  3 5-5 3   CHLORIDE 101 mmol/L  100-108   CARBON DIOXIDE 30 mmol/L  21-32   ANION GAP (CALC) 6 mmol/L  4-13   BLOOD UREA NITROGEN 14 mg/dL  5-25   CREATININE 0 86 mg/dL  0 60-1 30   Standardized to IDMS reference method   CALCIUM 9 2 mg/dL  8 3-10 1   BILI, TOTAL 0 53 mg/dL  0 20-1 00   ALK PHOSPHATAS 97 U/L     ALT (SGPT) 27 U/L  12-78   AST(SGOT) 23 U/L  5-45   ALBUMIN 3 9 g/dL  3 5-5 0   TOTAL PROTEIN 7 3 g/dL  6 4-8 2   eGFR 72 ml/min/1 73sq m     National Kid[employer] Disease Education Program recommendations are as follows:  GFR calculation is accurate only with a steady state creatinine  Chronic Kidney disease less than 60 ml/min/1 73 sq  meters  Kidney failure less than 15 ml/min/1 73 sq  meters  GLUCOSE FASTING 107 mg/dL H 65-99     (1) LIPID PANEL, FASTING [id number] 06:42AM Yuniel Hard     Test Name Result Flag Reference   CHOLESTEROL 173 mg/dL     HDL,DIRECT 51 mg/dL  40-60   Specimen collection should occur prior to Metamizole administration due to the potential for falsely depressed results  LDL CHOLESTEROL CALCULATED 98 mg/dL  0-100   This is a fasting blood test  Water,black tea or black  coffee only after 9:00pm the night before test  Drink 2 glasses of water the morning of test         Triglyceride:         Normal              <150 mg/dl       Borderline High    150-199 mg/dl       High               200-499 mg/dl       Very High          >499 mg/dl  Cholesterol:         Desirable        <200 mg/dl      Borderline High  200-239 mg/dl      High             >239 mg/dl  HDL Cholesterol:        High    >59 mg/dL      Low     <41 mg/dL  LDL CALCULATED:    This screening LDL is a calculated result    It does not have the accuracy of the Direct Measured LDL in the monitoring of patients with hyperlipidemia and/or statin therapy  Direct Measure LDL (RLE247) must be ordered separately in these patients  TRIGLYCERIDES 120 mg/dL  <=150   Specimen collection should occur prior to N-Acetylcysteine or Metamizole administration due to the potential for falsely depressed results  (1) TSH WITH FT4 REFLEX [id number] 06:42AM Halley Abbottderic     Test Name Result Flag Reference   TSH 2 870 uIU/mL  0 358-3 740   Patients undergoing fluorescein dye angiography may retain small amounts of fluorescein in the body for 48-72 hours post procedure  Samples containing fluorescein can produce falsely depressed TSH values  If the patient had this procedure,a specimen should be resubmitted post fluorescein clearance            The recommended reference ranges for TSH during pregnancy are as follows:  First trimester 0 1 to 2 5 uIU/mL  Second trimester  0 2 to 3 0 uIU/mL  Third trimester 0 3 to 3 0 uIU/m

## 2018-03-07 NOTE — PROGRESS NOTES
History of Present Illness    Revaccination   Vaccine Information: Vaccine(s) Given (names): Casey Kim [id number]  Spoke with patient regarding vaccine out of temperature range  Action(s): Pt will be revaccinated  Pt contacted and will call back  Pt called (attempt 1): [phone] KRL   Revaccination Completed: [id number]  Active Problems    1  Anxiety (300 00) (F41 9)   2  Dense breast tissue (793 82) (R92 2)   3  Encounter for routine gynecological examination (V72 31) (Z01 419)   4  Encounter for screening for malignant neoplasm of colon (V76 51) (Z12 11)   5  Encounter for screening mammogram for malignant neoplasm of breast (V76 12)   (Z12 31)   6  Fibromyalgia (729 1) (M79 7)   7  Hip pain, unspecified laterality   8  Intrinsic eczema (691 8) (L20 84)   9  Irritable bowel syndrome (564 1) (K58 9)   10  Need for prophylactic vaccination against Streptococcus pneumoniae (pneumococcus)    (V03 82) (Z23)   11  Need for prophylactic vaccination and inoculation against influenza (V04 81) (Z23)   12  Need for prophylactic vaccination with tetanus-diphtheria (TD) (V06 5) (Z23)   13  Palpitations (785 1) (R00 2)   14  Pap smear with atypical squamous cells of undetermined sign (ASC-US) (796 9)    (R89 6)   15  Polyp of sigmoid colon (211 3) (D12 5)   16  Routine Gynecological Exam With Cervical Pap Smear (V72 31)   17  Thickened endometrium (793 5) (R93 8)   18  Well adult on routine health check (V70 0) (Z00 00)    Immunizations  Influenza --- Mick Casarez: 27-Oct-2015  (60y)   PCV --- Mick Casarez: 06-Dec-2016  (61y)   Zoster --- Mick Casarez: 15-Nov-2015  (60y)     Current Meds   1  Amitriptyline HCl - 10 MG Oral Tablet   2  Colace CAPS   3  Cymbalta 60 MG Oral Capsule Delayed Release Particles   4  Fish Oil CAPS   5  Hydrocortisone-Acetic Acid 1-2 % Otic Solution; INSTILL 3 DROPS IN THE LEFT EAR AT   BEDTIME   6  Lisinopril-Hydrochlorothiazide 20-25 MG Oral Tablet; take one tablet by mouth every day   7   Lyrica 150 MG Oral Capsule; TAKE 1 CAPSULE TWICE DAILY   8  Lyrica 75 MG Oral Capsule; TAKE 1 CAPSULE TWICE DAILY   9  Meloxicam 7 5 MG Oral Tablet; TAKE 1 TABLET TWICE DAILY AS NEEDED   10  Multi-Vitamin Oral Tablet   11  Nasonex 50 MCG/ACT Nasal Suspension; USE 2 SPRAYS IN EACH NOSTRIL ONCE    DAILY   12  SulfaSALAzine 500 MG Oral Tablet Delayed Release; take 2 tablet twice daily    Allergies    1  No Known Drug Allergies    2  No Known Environmental Allergies   3  No Known Food Allergies    Plan    1   RVAC-Adacel 5-2-15 5 LF-MCG/0 5 Intramuscular Suspension    Education  Education Items with no Session   RVAC-Adacel 5-2-15 5 LF-MCG/0 5 Intramuscular Suspension;  Provided: [id number]  04:41PM; Counselor: Jose Mckeon;     Signatures   Electronically signed by : Heike Welsh DO; Jul 13 2017  5:07PM EST                       (Author)